# Patient Record
Sex: FEMALE | Race: WHITE | Employment: UNEMPLOYED | URBAN - METROPOLITAN AREA
[De-identification: names, ages, dates, MRNs, and addresses within clinical notes are randomized per-mention and may not be internally consistent; named-entity substitution may affect disease eponyms.]

---

## 2021-08-27 ENCOUNTER — HOSPITAL ENCOUNTER (EMERGENCY)
Facility: CLINIC | Age: 46
Discharge: HOME | End: 2021-08-27
Attending: NURSE PRACTITIONER | Admitting: NURSE PRACTITIONER

## 2021-08-27 VITALS
SYSTOLIC BLOOD PRESSURE: 145 MMHG | DIASTOLIC BLOOD PRESSURE: 76 MMHG | OXYGEN SATURATION: 100 % | RESPIRATION RATE: 17 BRPM | TEMPERATURE: 97.6 F | HEART RATE: 60 BPM

## 2021-08-27 DIAGNOSIS — H60.92 OTITIS EXTERNA, LEFT: ICD-10-CM

## 2021-08-27 LAB
ANION GAP SERPL CALCULATED.3IONS-SCNC: 5 MMOL/L (ref 3–14)
BASOPHILS # BLD AUTO: 0 10E3/UL (ref 0–0.2)
BASOPHILS NFR BLD AUTO: 0 %
BUN SERPL-MCNC: 12 MG/DL (ref 7–30)
CALCIUM SERPL-MCNC: 8.9 MG/DL (ref 8.5–10.1)
CHLORIDE BLD-SCNC: 109 MMOL/L (ref 94–109)
CO2 SERPL-SCNC: 23 MMOL/L (ref 20–32)
CREAT SERPL-MCNC: 0.69 MG/DL (ref 0.52–1.04)
EOSINOPHIL # BLD AUTO: 0.1 10E3/UL (ref 0–0.7)
EOSINOPHIL NFR BLD AUTO: 1 %
ERYTHROCYTE [DISTWIDTH] IN BLOOD BY AUTOMATED COUNT: 14.7 % (ref 10–15)
GFR SERPL CREATININE-BSD FRML MDRD: >90 ML/MIN/1.73M2
GLUCOSE BLD-MCNC: 94 MG/DL (ref 70–99)
HCT VFR BLD AUTO: 37.5 % (ref 35–47)
HGB BLD-MCNC: 11.4 G/DL (ref 11.7–15.7)
IMM GRANULOCYTES # BLD: 0 10E3/UL
IMM GRANULOCYTES NFR BLD: 0 %
LYMPHOCYTES # BLD AUTO: 2.1 10E3/UL (ref 0.8–5.3)
LYMPHOCYTES NFR BLD AUTO: 31 %
MCH RBC QN AUTO: 24.3 PG (ref 26.5–33)
MCHC RBC AUTO-ENTMCNC: 30.4 G/DL (ref 31.5–36.5)
MCV RBC AUTO: 80 FL (ref 78–100)
MONOCYTES # BLD AUTO: 0.6 10E3/UL (ref 0–1.3)
MONOCYTES NFR BLD AUTO: 9 %
NEUTROPHILS # BLD AUTO: 4 10E3/UL (ref 1.6–8.3)
NEUTROPHILS NFR BLD AUTO: 59 %
NRBC # BLD AUTO: 0 10E3/UL
NRBC BLD AUTO-RTO: 0 /100
PLATELET # BLD AUTO: 230 10E3/UL (ref 150–450)
POTASSIUM BLD-SCNC: 3.9 MMOL/L (ref 3.4–5.3)
RBC # BLD AUTO: 4.7 10E6/UL (ref 3.8–5.2)
SODIUM SERPL-SCNC: 137 MMOL/L (ref 133–144)
WBC # BLD AUTO: 6.8 10E3/UL (ref 4–11)

## 2021-08-27 PROCEDURE — 36415 COLL VENOUS BLD VENIPUNCTURE: CPT | Performed by: EMERGENCY MEDICINE

## 2021-08-27 PROCEDURE — 85025 COMPLETE CBC W/AUTO DIFF WBC: CPT | Performed by: EMERGENCY MEDICINE

## 2021-08-27 PROCEDURE — 85025 COMPLETE CBC W/AUTO DIFF WBC: CPT | Performed by: NURSE PRACTITIONER

## 2021-08-27 PROCEDURE — 80048 BASIC METABOLIC PNL TOTAL CA: CPT | Performed by: NURSE PRACTITIONER

## 2021-08-27 PROCEDURE — 99283 EMERGENCY DEPT VISIT LOW MDM: CPT

## 2021-08-27 PROCEDURE — 80048 BASIC METABOLIC PNL TOTAL CA: CPT | Performed by: EMERGENCY MEDICINE

## 2021-08-27 RX ORDER — CIPROFLOXACIN AND DEXAMETHASONE 3; 1 MG/ML; MG/ML
4 SUSPENSION/ DROPS AURICULAR (OTIC) 2 TIMES DAILY
Qty: 4 ML | Refills: 0 | Status: SHIPPED | OUTPATIENT
Start: 2021-08-27 | End: 2021-09-06

## 2021-08-27 ASSESSMENT — ENCOUNTER SYMPTOMS
PHOTOPHOBIA: 0
TROUBLE SWALLOWING: 0
NAUSEA: 1
MYALGIAS: 0
FACIAL ASYMMETRY: 0
ARTHRALGIAS: 0
ABDOMINAL PAIN: 0
SHORTNESS OF BREATH: 0
SORE THROAT: 0
CHEST TIGHTNESS: 0
RHINORRHEA: 0
VOMITING: 1
LIGHT-HEADEDNESS: 0
FACIAL SWELLING: 1
FEVER: 0
HEADACHES: 0
NECK PAIN: 0

## 2021-08-27 NOTE — ED TRIAGE NOTES
Patient complaining of left sided body pain and swelling since this AM.  Also complaining of left ear drainage.

## 2021-08-27 NOTE — ED PROVIDER NOTES
History     Chief Complaint:  Facial Pain       The history is provided by the patient and a relative.  used: family member, [place].      Adela El is a 46 year old female with no significant medical history who presents with left-sided facial and ear pain which began last night. She reports that last night, she began having some watery, bloody discharge from the left ear. She did feel febrile at this time and took Tylenol; she did have some improvement with this. This morning, she began experiencing pain extending to the left side of her face. Adela does note that she has had ear infections in the past and her symptoms that she presents with today feel similar but more severe. She has used ear drops for her past infections but she denies ever being seen by an ENT doctor. She denies sore throat, rhinorrhea, headache, fevers, but does note some nausea and vomiting secondary to pain.     Review of Systems   Constitutional: Negative for fever.   HENT: Positive for ear discharge, ear pain and facial swelling. Negative for hearing loss, rhinorrhea, sore throat and trouble swallowing.    Eyes: Negative for photophobia and visual disturbance.   Respiratory: Negative for chest tightness and shortness of breath.    Cardiovascular: Negative for chest pain.   Gastrointestinal: Positive for nausea and vomiting. Negative for abdominal pain.   Musculoskeletal: Negative for arthralgias, myalgias and neck pain.   Skin: Negative for rash.   Neurological: Negative for facial asymmetry, light-headedness and headaches.   All other systems reviewed and are negative.    Allergies:  The patient has no known allergies.     Medications:  The patient is currently on no regular medications.     Past Medical History:    The patient has no pertinent medical history    Social History:  Patient presents to the ED with her niece.  Patient is new to [place].    Physical Exam     Patient Vitals for the past 24  hrs:   BP Temp Temp src Pulse Resp SpO2   08/27/21 1328 (!) 145/76 97.6  F (36.4  C) Tympanic 60 17 100 %       Physical Exam  Nursing notes reviewed. Vitals reviewed.  General: Alert. Well kept.  Eyes:  Conjunctiva non-injected, non-icteric.  Ears: bilateral TM normal. Left external canal with erythema and thick exudate and pain with movement of the tragus. No mastoid tenderness. No trismus.   Neck/Throat: Moist mucous membranes. Normal voice.  Cardiac: Regular rhythm. Normal heart sounds.  Pulmonary: Clear and equal breath sounds bilaterally.   Musculoskeletal: Normal gross range of motion of all 4 extremities.   Neurological: Alert and oriented x4.   Skin: Warm and dry. Mild preauricular swelling left ear.  Psych: Affect normal. Good eye contact.    Emergency Department Course     Laboratory:    CBC: WBC 6.8, HGB 11.4 (L),    BMP: all WNL (Creatinine 0.69)     Emergency Department Course:    Reviewed:  I reviewed nursing notes, vitals, past medical history, and care everywhere    Assessments:  1627 I obtained history and examined the patient as noted above.     Disposition:  The patient was discharged to home.     Impression & Plan     Medical Decision Making:  Adela El is a 46 year old female who presents for evaluation of otalgia and very mild preauricular facial swelling on the left side.  The patient has an exam consistent with otitis externa.  Differential considered in this patient with otalgia included mastoiditis, meningitis, perforation, cerumen impaction, mass, dental abscess, or peritonsillar abscess, referred pain from the chest, cholesteatoma, otitis externa, etc.  She may take Tylenol or Ibuprofen for pain.  Drops for the externa are noted below.  I will also start her on Augmentin due to the mild preauricular swelling.  Return if increasing pain, fever, decrease in hearing or ear discharge.  Follow-up with primary physician in 7-10 days, if symptoms persist and ENT consultation  may be needed as outpatient.    Diagnosis:    ICD-10-CM    1. Otitis externa, left  H60.92        Discharge Medications:  New Prescriptions    AMOXICILLIN-CLAVULANATE (AUGMENTIN) 875-125 MG TABLET    Take 1 tablet by mouth 2 times daily for 10 days    CIPROFLOXACIN-DEXAMETHASONE (CIPRODEX) 0.3-0.1 % OTIC SUSPENSION    Place 4 drops Into the left ear 2 times daily for 10 days       Scribe Disclosure:  I, Ritu Sanderson, am serving as a scribe at 4:27 PM on 8/27/2021 to document services personally performed by Jessie Hernandez, ALONSO based on my observations and the provider's statements to me.       Jessie Hernandez, CNP  08/27/21 2040

## 2025-06-03 ENCOUNTER — APPOINTMENT (OUTPATIENT)
Dept: GENERAL RADIOLOGY | Facility: CLINIC | Age: 50
End: 2025-06-03
Attending: EMERGENCY MEDICINE
Payer: MEDICAID

## 2025-06-03 ENCOUNTER — HOSPITAL ENCOUNTER (EMERGENCY)
Facility: CLINIC | Age: 50
Discharge: HOME | End: 2025-06-03
Attending: EMERGENCY MEDICINE | Admitting: EMERGENCY MEDICINE
Payer: MEDICAID

## 2025-06-03 VITALS
RESPIRATION RATE: 15 BRPM | DIASTOLIC BLOOD PRESSURE: 77 MMHG | SYSTOLIC BLOOD PRESSURE: 137 MMHG | HEART RATE: 62 BPM | OXYGEN SATURATION: 99 %

## 2025-06-03 DIAGNOSIS — R07.9 CHEST PAIN, UNSPECIFIED TYPE: ICD-10-CM

## 2025-06-03 LAB
ANION GAP SERPL CALCULATED.3IONS-SCNC: 14 MMOL/L (ref 7–15)
BASOPHILS # BLD AUTO: 0 10E3/UL (ref 0–0.2)
BASOPHILS NFR BLD AUTO: 0 %
BUN SERPL-MCNC: 9.3 MG/DL (ref 6–20)
CALCIUM SERPL-MCNC: 8.9 MG/DL (ref 8.8–10.4)
CHLORIDE SERPL-SCNC: 106 MMOL/L (ref 98–107)
CREAT SERPL-MCNC: 0.62 MG/DL (ref 0.51–0.95)
EGFRCR SERPLBLD CKD-EPI 2021: >90 ML/MIN/1.73M2
EOSINOPHIL # BLD AUTO: 0.1 10E3/UL (ref 0–0.7)
EOSINOPHIL NFR BLD AUTO: 1 %
ERYTHROCYTE [DISTWIDTH] IN BLOOD BY AUTOMATED COUNT: 14.9 % (ref 10–15)
GLUCOSE SERPL-MCNC: 90 MG/DL (ref 70–99)
HCG SERPL QL: NEGATIVE
HCO3 SERPL-SCNC: 19 MMOL/L (ref 22–29)
HCT VFR BLD AUTO: 34.4 % (ref 35–47)
HGB BLD-MCNC: 10.7 G/DL (ref 11.7–15.7)
IMM GRANULOCYTES # BLD: 0 10E3/UL
IMM GRANULOCYTES NFR BLD: 0 %
LYMPHOCYTES # BLD AUTO: 2.3 10E3/UL (ref 0.8–5.3)
LYMPHOCYTES NFR BLD AUTO: 28 %
MCH RBC QN AUTO: 25.1 PG (ref 26.5–33)
MCHC RBC AUTO-ENTMCNC: 31.1 G/DL (ref 31.5–36.5)
MCV RBC AUTO: 81 FL (ref 78–100)
MONOCYTES # BLD AUTO: 0.5 10E3/UL (ref 0–1.3)
MONOCYTES NFR BLD AUTO: 6 %
NEUTROPHILS # BLD AUTO: 5.3 10E3/UL (ref 1.6–8.3)
NEUTROPHILS NFR BLD AUTO: 64 %
NRBC # BLD AUTO: 0 10E3/UL
NRBC BLD AUTO-RTO: 0 /100
PLATELET # BLD AUTO: 232 10E3/UL (ref 150–450)
POTASSIUM SERPL-SCNC: 3.9 MMOL/L (ref 3.4–5.3)
RBC # BLD AUTO: 4.27 10E6/UL (ref 3.8–5.2)
SODIUM SERPL-SCNC: 139 MMOL/L (ref 135–145)
TROPONIN T SERPL HS-MCNC: <6 NG/L
WBC # BLD AUTO: 8.3 10E3/UL (ref 4–11)

## 2025-06-03 PROCEDURE — 84484 ASSAY OF TROPONIN QUANT: CPT | Performed by: EMERGENCY MEDICINE

## 2025-06-03 PROCEDURE — 36415 COLL VENOUS BLD VENIPUNCTURE: CPT | Performed by: EMERGENCY MEDICINE

## 2025-06-03 PROCEDURE — 71046 X-RAY EXAM CHEST 2 VIEWS: CPT

## 2025-06-03 PROCEDURE — 84703 CHORIONIC GONADOTROPIN ASSAY: CPT | Performed by: EMERGENCY MEDICINE

## 2025-06-03 PROCEDURE — 80048 BASIC METABOLIC PNL TOTAL CA: CPT | Performed by: EMERGENCY MEDICINE

## 2025-06-03 PROCEDURE — 85004 AUTOMATED DIFF WBC COUNT: CPT | Performed by: EMERGENCY MEDICINE

## 2025-06-03 PROCEDURE — 99284 EMERGENCY DEPT VISIT MOD MDM: CPT | Mod: 25

## 2025-06-03 ASSESSMENT — COLUMBIA-SUICIDE SEVERITY RATING SCALE - C-SSRS
1. IN THE PAST MONTH, HAVE YOU WISHED YOU WERE DEAD OR WISHED YOU COULD GO TO SLEEP AND NOT WAKE UP?: NO
6. HAVE YOU EVER DONE ANYTHING, STARTED TO DO ANYTHING, OR PREPARED TO DO ANYTHING TO END YOUR LIFE?: NO
2. HAVE YOU ACTUALLY HAD ANY THOUGHTS OF KILLING YOURSELF IN THE PAST MONTH?: NO

## 2025-06-03 ASSESSMENT — ACTIVITIES OF DAILY LIVING (ADL)
ADLS_ACUITY_SCORE: 41

## 2025-06-03 NOTE — DISCHARGE INSTRUCTIONS
Please make an appointment to follow up with your primary care provider in 3 days even if entirely better.

## 2025-06-03 NOTE — ED TRIAGE NOTES
[facility] in [place].  Patient was suppose to have a CT scan today for ongoing chest pain.  Patient was given 0.8mg of nitroglycerin at the clinic and after the scan she developed nausea and vomiting.  Patient feeling dizzy but able to ambulate      Triage Assessment (Adult)       Row Name 06/03/25 1506          Triage Assessment    Airway WDL WDL        Respiratory WDL    Respiratory WDL WDL        Skin Circulation/Temperature WDL    Skin Circulation/Temperature WDL WDL        Cardiac WDL    Cardiac WDL WDL        Peripheral/Neurovascular WDL    Peripheral Neurovascular WDL WDL        Cognitive/Neuro/Behavioral WDL    Cognitive/Neuro/Behavioral WDL WDL

## 2025-06-03 NOTE — ED PROVIDER NOTES
Emergency Department Note      History of Present Illness     Chief Complaint   Nausea & Vomiting      HPI     Adela El is a 50 year old female presents with chest pain.  Patient reports over the last 3-4 weeks she has noted intermittent chest pain and shortness of breath.  She reports the symptoms will occur when lying flat or coughing.  She denies any chest pain, shortness of breath with exertion.  She admits to having intermittent dizziness described as a lightheadedness that she was going to pass out with ambulation/exertion but never chest pain/shortness of breath.  She has been followed by cardiologist and today was undergoing a CT coronary study.  When she lied down for the CT scan, she developed chest pain and shortness of breath.  She was given a nitroglycerin and was referred to the ED.  She now feels back to her baseline and has no current symptoms.  She has no history of DVT, PE, unilateral swelling, recent immobilization, hemoptysis, malignancy or estrogen use    Independent Historian   None    Review of External Notes   I reviewed cardiology note from 5/29/2025.  Patient is reporting with worsening dyspnea on exertion and intermittent episodes of chest pressure.  She has a history of diabetes with unspecified structural valvular disease.  Plan was for echocardiogram and coronary CT angiogram.    Echocardiogram from 6/2/2025 reveals normal left ventricular size with EF of 60%.  Bicuspid aortic valve with moderate stenosis.  No pericardial effusion    Past Medical History     Medical History and Problem List   Diabetes mellitus aortic stenosis      Medications   Metformin    Surgical History   No past surgical history on file.    Physical Exam     Patient Vitals for the past 24 hrs:   BP Pulse Resp SpO2   06/03/25 1730 137/77 62 15 99 %   06/03/25 1715 124/78 54 11 99 %   06/03/25 1700 129/75 -- -- --   06/03/25 1630 (!) 143/82 55 21 99 %     Physical Exam      HEENT:    Oropharynx is  moist  Eyes:    Conjunctiva normal  Neck:     Supple, no meningismus.     CV:     Regular rate and rhythm.      Grade 3 systolic murmur     No unilateral leg swelling.       2+ radial pulses bilateral.       No lower extremity edema.  PULM:    Clear to auscultation bilateral.       No respiratory distress.      Good air exchange.     No rales or wheezing.     No stridor.  ABD:    Soft, non-tender, non-distended.       No pulsatile masses.       No rebound, guarding or rigidity.  MSK:     No gross deformity to all four extremities.   LYMPH:   No cervical lymphadenopathy.  NEURO:   Alert. Good muscle tone, no atrophy.  Skin:    Warm, dry and intact.    Psych:    Mood is good and affect is appropriate.      Diagnostics     Lab Results   Labs Ordered and Resulted from Time of ED Arrival to Time of ED Departure   BASIC METABOLIC PANEL - Abnormal       Result Value    Sodium 139      Potassium 3.9      Chloride 106      Carbon Dioxide (CO2) 19 (*)     Anion Gap 14      Urea Nitrogen 9.3      Creatinine 0.62      GFR Estimate >90      Calcium 8.9      Glucose 90     CBC WITH PLATELETS AND DIFFERENTIAL - Abnormal    WBC Count 8.3      RBC Count 4.27      Hemoglobin 10.7 (*)     Hematocrit 34.4 (*)     MCV 81      MCH 25.1 (*)     MCHC 31.1 (*)     RDW 14.9      Platelet Count 232      % Neutrophils 64      % Lymphocytes 28      % Monocytes 6      % Eosinophils 1      % Basophils 0      % Immature Granulocytes 0      NRBCs per 100 WBC 0      Absolute Neutrophils 5.3      Absolute Lymphocytes 2.3      Absolute Monocytes 0.5      Absolute Eosinophils 0.1      Absolute Basophils 0.0      Absolute Immature Granulocytes 0.0      Absolute NRBCs 0.0     TROPONIN T, HIGH SENSITIVITY - Normal    Troponin T, High Sensitivity <6     HCG QUALITATIVE PREGNANCY - Normal    hCG Serum Qualitative Negative         Imaging   Chest XR,  PA & LAT   Final Result   IMPRESSION: Negative chest.          EKG   I reviewed EKG performed at facility  which revealed no dysrhythmia, ischemic changes.  Normal sinus rhythm.  No old EKGs for comparison.      Independent Interpretation   CXR: No pneumothorax or infiltrate.    ED Course      Medications Administered   Medications - No data to display    Procedures   Procedures     Discussion of Management   I does discussed case with the radiologist at [facility] who perform CT coronary angiogram who reported coronary calcium score is 0 with incidental finding of aortic aneurysm measuring 45 mm.    ED Course        Additional Documentation  None    Medical Decision Making / Diagnosis     CMS Diagnoses: None    MIPS   None               MDM   Adela El is a 50 year old female presents with recurrent chest pain that occurred during coronary CT scan.  EKG performed just prior to arrival from [facility] was reviewed in which there is no ischemic changes.  Normal sinus rhythm.  Troponin is undetectable.  I called the radiologist who was performing the CT coronary angiogram today.  He reports the CT coronary calcium score is 0 with an incidental finding of mild aortic dilation at 45 mm..  No indication for serial enzymes.  Low suspicion for PE and is PERC negative thus no indication for CT scan of the chest.  The remainder workup is reassuring.  Differential diagnosis would include GERD, esophagitis, esophageal spasm, costochondritis, pleurisy.  Patient will discharge home with close follow-up with primary care physician or cardiologist and return to ED for worsening symptoms.    Disposition   The patient was discharged.     Diagnosis     ICD-10-CM    1. Chest pain, unspecified type  R07.9            Discharge Medications   There are no discharge medications for this patient.        MD Cl Guadalupe Jeremiah R, MD  06/03/25 1808       Brian Smallwood MD  06/03/25 1820